# Patient Record
Sex: MALE | Race: WHITE | HISPANIC OR LATINO | Employment: FULL TIME | URBAN - METROPOLITAN AREA
[De-identification: names, ages, dates, MRNs, and addresses within clinical notes are randomized per-mention and may not be internally consistent; named-entity substitution may affect disease eponyms.]

---

## 2018-07-21 ENCOUNTER — APPOINTMENT (OUTPATIENT)
Dept: RADIOLOGY | Facility: MEDICAL CENTER | Age: 48
End: 2018-07-21
Attending: EMERGENCY MEDICINE
Payer: COMMERCIAL

## 2018-07-21 ENCOUNTER — HOSPITAL ENCOUNTER (EMERGENCY)
Facility: MEDICAL CENTER | Age: 48
End: 2018-07-21
Attending: EMERGENCY MEDICINE
Payer: COMMERCIAL

## 2018-07-21 VITALS
DIASTOLIC BLOOD PRESSURE: 85 MMHG | TEMPERATURE: 97.6 F | SYSTOLIC BLOOD PRESSURE: 121 MMHG | BODY MASS INDEX: 24.8 KG/M2 | OXYGEN SATURATION: 100 % | RESPIRATION RATE: 18 BRPM | HEART RATE: 72 BPM | HEIGHT: 67 IN | WEIGHT: 158 LBS

## 2018-07-21 DIAGNOSIS — S43.015A ANTERIOR DISLOCATION OF LEFT SHOULDER, INITIAL ENCOUNTER: ICD-10-CM

## 2018-07-21 PROCEDURE — 73080 X-RAY EXAM OF ELBOW: CPT | Mod: LT

## 2018-07-21 PROCEDURE — 73020 X-RAY EXAM OF SHOULDER: CPT | Mod: LT

## 2018-07-21 PROCEDURE — 71045 X-RAY EXAM CHEST 1 VIEW: CPT

## 2018-07-21 PROCEDURE — 73030 X-RAY EXAM OF SHOULDER: CPT | Mod: LT

## 2018-07-21 PROCEDURE — 72100 X-RAY EXAM L-S SPINE 2/3 VWS: CPT

## 2018-07-21 PROCEDURE — 96374 THER/PROPH/DIAG INJ IV PUSH: CPT

## 2018-07-21 PROCEDURE — 99285 EMERGENCY DEPT VISIT HI MDM: CPT

## 2018-07-21 PROCEDURE — 700111 HCHG RX REV CODE 636 W/ 250 OVERRIDE (IP): Performed by: EMERGENCY MEDICINE

## 2018-07-21 PROCEDURE — 23650 CLTX SHO DSLC W/MNPJ WO ANES: CPT

## 2018-07-21 RX ORDER — IBUPROFEN 800 MG/1
800 TABLET ORAL EVERY 8 HOURS PRN
Qty: 30 TAB | Refills: 0 | Status: SHIPPED | OUTPATIENT
Start: 2018-07-21 | End: 2018-07-24

## 2018-07-21 RX ADMIN — PROPOFOL 70 MG: 10 INJECTION, EMULSION INTRAVENOUS at 17:14

## 2018-07-21 ASSESSMENT — LIFESTYLE VARIABLES: DO YOU DRINK ALCOHOL: NO

## 2018-07-21 NOTE — ED TRIAGE NOTES
"Chief Complaint   Patient presents with   • Fall     about 3 feet down off a ladder and caught himself with his left arm.   • Shoulder Injury     Pt felt a pop in his left shoulder. + deformity.      Pt was given 200 mcg of fentanyl and 5 mg versed pta by EMS. Pt is Cypriot speaking only, coworker at bedside. -LOC.   /85   Pulse 74   Temp 36.4 °C (97.6 °F)   Resp 16   Ht 1.702 m (5' 7\")   Wt 71.7 kg (158 lb)   SpO2 96%   BMI 24.75 kg/m²   In gown, on monitor, chart up for ERP.   "

## 2018-07-21 NOTE — LETTER
"  FORM C-4:  EMPLOYEE’S CLAIM FOR COMPENSATION/ REPORT OF INITIAL TREATMENT  EMPLOYEE’S CLAIM - PROVIDE ALL INFORMATION REQUESTED   First Name  Jose Last Name  Grayson Birthdate             Age  1970 47 y.o. 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 (if applicable)  Climbing ladder    How did this injury or occupational disease occur? Be specific and answer in detail. Use additional sheet if necessary)  Climbing ladder, foot slipped off ladder rung, tried to grab onto ladder with hand to catch myself from the fall; fell 3-4 ft   If you believe that you have an occupational disease, when did you first have knowledge of the disability and it relationship to your employment?  n/a Witnesses to the Accident  Avis Negrete Maria G     Nature of Injury or Occupational Disease  Workers' Compensation  Part(s) of Body Injured or Affected  Upper Arm (L), Lower Arm (L), Soft Tissue    I certify that the above is true and correct to the best of my " knowledge and that I have provided this information in order to obtain the benefits of [place] or [place] of [place]).  I hereby authorize any physician, chiropractor, surgeon, practitioner, or other person, any hospital, including [facility] or [facility], any medical service organization, any insurance company, or other institution or organization to release to each other, any medical or other information, including benefits paid or payable, pertinent to this injury or disease, except information relative to diagnosis, treatment and/or counseling for AIDS, psychological conditions, alcohol or controlled substances, for which I must give specific authorization.  A Photostat of this authorization shall be as valid as the original.   Date 07/21/2018 Place  [facility] Employee’s Signature   THIS REPORT MUST BE COMPLETED AND MAILED WITHIN 3 WORKING DAYS OF TREATMENT   Place  [facility], EMERGENCY DEPT  Name of Facility   [facility]   Date  7/21/2018 Diagnosis  (S43.015A) Anterior dislocation of left shoulder, initial encounter Is there evidence the injured employee was under the influence of alcohol and/or another controlled substance at the time of accident?   Hour  5:45 PM Description of Injury or Disease  Anterior dislocation of left shoulder, initial encounter No   Treatment  Physician evaluation and treatment of left sided shoulder dislocation with closed reduction under conscious sedation  Have you advised the patient to remain off work five days or more?         No   X-Ray Findings  Positive  Comments:dislocation of left shoulder   If Yes   From Date    To Date      From information given by the employee, together with medical evidence, can you directly connect this injury or occupational disease as job incurred?  Yes If No, is the employee capable of: Full Duty  No Modified  "Duty  Yes   Is additional medical care by a physician indicated?  Yes  Comments:follow-up with orthopedics to assess for shoulder injury If Modified Duty, Specify any Limitations / Restrictions  No use of left upper extremity until seen by orthopedics     Do you know of any previous injury or disease contributing to this condition or occupational disease?  No   Date  7/21/2018 Print Doctor’s Name  Ben Castro I certify the employer’s copy of this form was mailed on:   Address  [address] [place] [id number] [phone] Insurer’s Use Only   [facility]  [id number]    Provider’s Tax ID Number  [id number] Telephone  Dept: [phone]    Doctor’s Signature  e-BEN Crowe M.D. Degree   MD    Original - TREATING PHYSICIAN OR CHIROPRACTOR   Pg 2-Insurer/TPA   Pg 3-Employer   Pg 4-Employee                                                                                                  Form C-4 (rev01/03)     BRIEF DESCRIPTION OF RIGHTS AND BENEFITS  (Pursuant to [place]RS 616C.050)    Notice of Injury or Occupational Disease (Incident Report Form C-1): If an injury or occupational disease (OD) arises out of and in the course of employment, you must provide written notice to your employer as soon as practicable, but no later than 7 days after the accident or OD. Your employer shall maintain a sufficient supply of the required forms.  Claim for Compensation (Form C-4): If medical treatment is sought, the form C-4 is available at the place of initial treatment. A completed \"Claim for Compensation\" (Form C-4) must be filed within 90 days after an accident or OD. The treating physician or chiropractor must, within 3 working days after treatment, complete and mail to the employer, the employer's insurer and third-party , the Claim for Compensation.  Medical Treatment: If you require medical treatment for your on-the-job injury or OD, you may be required to select a physician or " chiropractor from a list provided by your workers’ compensation insurer, if it has contracted with an Organization for Managed Care (MCO) or Preferred Provider Organization (PPO) or providers of health care. If your employer has not entered into a contract with an MCO or PPO, you may select a physician or chiropractor from the Panel of Physicians and Chiropractors. Any medical costs related to your industrial injury or OD will be paid by your insurer.  Temporary Total Disability (TTD): If your doctor has certified that you are unable to work for a period of at least 5 consecutive days, or 5 cumulative days in a 20-day period, or places restrictions on you that your employer does not accommodate, you may be entitled to TTD compensation.  Temporary Partial Disability (TPD): If the wage you receive upon reemployment is less than the compensation for TTD to which you are entitled, the insurer may be required to pay you TPD compensation to make up the difference. TPD can only be paid for a maximum of 24 months.  Permanent Partial Disability (PPD): When your medical condition is stable and there is an indication of a PPD as a result of your injury or OD, within 30 days, your insurer must arrange for an evaluation by a rating physician or chiropractor to determine the degree of your PPD. The amount of your PPD award depends on the date of injury, the results of the PPD evaluation and your age and wage.  Permanent Total Disability (PTD): If you are medically certified by a treating physician or chiropractor as permanently and totally disabled and have been granted a PTD status by your insurer, you are entitled to receive monthly benefits not to exceed 66 2/3% of your average monthly wage. The amount of your PTD payments is subject to reduction if you previously received a PPD award.  Vocational Rehabilitation Services: You may be eligible for vocational rehabilitation services if you are unable to return to the job due to a  permanent physical impairment or permanent restrictions as a result of your injury or occupational disease.  Transportation and Per [place] Reimbursement: You may be eligible for travel expenses and per [place] associated with medical treatment.  Reopening: You may be able to reopen your claim if your condition worsens after claim closure.  Appeal Process: If you disagree with a written determination issued by the insurer or the insurer does not respond to your request, you may appeal to the Department of Administration, , by following the instructions contained in your determination letter. You must appeal the determination within 70 days from the date of the determination letter at [address], [address], [place], [place] [address], or [address], [address], [place] [address]. If you disagree with the  decision, you may appeal to the Department of Administration, . You must file your appeal within 30 days from the date of the  decision letter at [address], [address], [place], [place] [address], or [address] [facility] [address], [place] [address]. If you disagree with a decision of an , you may file a petition for judicial review with the District Court. You must do so within 30 days of the Appeal Officer’s decision. You may be represented by an  at your own expense or you may contact the [facility] for possible representation.  [place]  for Injured Workers (NAIW): If you disagree with a  decision, you may request that NAIW represent you without charge at an  Hearing. For information regarding denial of benefits, you may contact the [facility] at: [address], [address], [place] [address], [phone], or [address], [place] [address], [phone]  To File a Complaint with the Division: If you wish to file a complaint with the   of the Division of Industrial Relations (DIR), please contact the Workers’ Compensation Section, [address], [address], [place], [place] [address], telephone [phone]) [phone], or [address], [address], [place], [place] [address], telephone [phone].  For assistance with Workers’ Compensation Issues: you may contact the Office of the Governor Consumer Health Assistance, [address] [place] [address], [address], [place], [place] [address], Toll Free 1-798.210.7900, Web site: [email or web address]., E-mail [email or web address]                                                                                                                                                                               __________________________________________________________________                                    07/21/2018            Employee Name / Signature                                                                                                                            Date                                       D-2 (rev. 10/07)

## 2018-07-21 NOTE — ED PROVIDER NOTES
ED Provider Note    CHIEF COMPLAINT  Chief Complaint   Patient presents with   • Fall     about 3 feet down off a ladder and caught himself with his left arm.   • Shoulder Injury     Pt felt a pop in his left shoulder. + deformity.        HPI  Jose Galicia is a 47 y.o. male who presents for evaluation of acute pain to the left shoulder with injury. The patient was at work was also some scaffolding was about ready to fall grabbed onto something above his head with his left hand and felt a popping sensation in his left shoulder. He did not fall and strike his head chest abdomen or pelvis. Paramedics arrived and noted deformity to his acromioclavicular joint. IV was established and he was given 200 µg of fentanyl and 5 mg of Versed. He has no stated or significant medical history injury occurred 30 minutes prior to arrival no other complaints    REVIEW OF SYSTEMS  See HPI for further details. No numbness tingling weakness head injury loss of consciousness All other systems are negative.     PAST MEDICAL HISTORY  No past medical history on file.  No significant medical history  FAMILY HISTORY  Noncontributory    SOCIAL HISTORY  Social History     Social History   • Marital status: Single     Spouse name: N/A   • Number of children: N/A   • Years of education: N/A     Social History Main Topics   • Smoking status: Current Some Day Smoker   • Smokeless tobacco: Not on file   • Alcohol use Yes      Comment: SOCIALLY   • Drug use: No   • Sexual activity: Not on file     Other Topics Concern   • Not on file     Social History Narrative   • No narrative on file     Smoke cigarettes nor all of this  SURGICAL HISTORY  No past surgical history on file.    CURRENT MEDICATIONS  Home Medications     Reviewed by Nataly Perla R.N. (Registered Nurse) on 07/21/18 at 1619  Med List Status: Complete   Medication Last Dose Status   oxycodone-acetaminophen (PERCOCET) 5-325 MG TABS not taking Active                ALLERGIES  No Known  "Allergies    PHYSICAL EXAM  VITAL SIGNS: /85   Pulse 69   Temp 36.4 °C (97.6 °F)   Resp 18   Ht 1.702 m (5' 7\")   Wt 71.7 kg (158 lb)   SpO2 99%   BMI 24.75 kg/m²       Constitutional: Patient appears to be uncomfortable   HENT: Normocephalic, Atraumatic, Bilateral external ears normal, Oropharynx moist, No oral exudates, Nose normal.   Eyes: PERRLA, EOMI, Conjunctiva normal, No discharge.   Neck: Normal range of motion, No tenderness, Supple, No stridor.   Cardiovascular: Normal heart rate, Normal rhythm, No murmurs, No rubs, No gallops.   Thorax & Lungs: Normal breath sounds, No respiratory distress, No wheezing, No chest tenderness.   Abdomen: Bowel sounds normal, Soft, No tenderness, No masses, No pulsatile masses.   Skin: Warm, Dry, No erythema, No rash.   Back: No tenderness, No CVA tenderness.   Extremities: Left upper extremity exam is notable for an acromioclavicular step-off consistent with a dislocation. No bony tenderness over the clavicle proximal humerus elbow wrist. Sensation over the deltoid is normal radial pulses intact   Neurologic: Alert & oriented x 3, Normal motor function, Normal sensory function, No focal deficits noted.   Psychiatric: Anxious     DX-SHOULDER 1 VIEW LEFT   Final Result         1. Interval relocation of the left glenohumeral joint. No obvious fracture on this limited single AP view.      DX-ELBOW-COMPLETE 3+ LEFT   Final Result      Normal elbow series.      DX-CHEST-PORTABLE (1 VIEW)   Final Result         1. No acute cardiopulmonary abnormalities are identified.      DX-SHOULDER 2+ LEFT   Final Result      Anterior left shoulder dislocation.               INTERPRETING LOCATION:  [address] [place] [address], [id number]            RADIOLOGY/PROCEDURES  Physician procedure: Closed reduction of left anterior dislocation of shoulder. Consent was obtained from the wife. The patient was placed on supplemental oxygen and cardiac monitoring. He was given a total of 70 mg of IV " propofol. Using traction countertraction with and scapular manipulation the shoulder was dislocated and No complication. The patient was placed in a shoulder immobilizer. Postprocedure radiograph demonstrated appropriate reduction no complications    COURSE & MEDICAL DECISION MAKING  Pertinent Labs & Imaging studies reviewed. (See chart for details)  Patient tolerated the procedure quite well. He will be placed in a shoulder immobilizer. I'll give him a prescription of high-dose ibuprofen and refer him to orthopedics for ongoing management    FINAL IMPRESSION  1. Left-sided shoulder dislocation with ERP closed reduction under conscious sedation      Electronically signed by: Ben Castro, 7/21/2018 4:30 PM

## 2018-07-22 NOTE — ED NOTES
ERP, RN, Tech and RT at bedside for conscious sedation with shoulder relocation. Relocation successful. Pt placed in sling. VSS. Family to bedside and aware of POC.

## 2018-07-22 NOTE — ED NOTES
All lines and monitors discontinued. Discharge instructions given, questions answered.    AMbulated out of ER, escorted by RN.  Instructed not to drive after taking pain medication and pt verbalizes understanding.  Rx x 1 given.

## 2018-07-22 NOTE — PROGRESS NOTES
"Pt's family wanted a doctors note.  I reprinted the d/c instructions and highlighted \"Follow-up with [facility]\" listed on second page.  I also gave him a work note that stated he needs to f/u with [facility] in 3 days.  Family verbalized understanding.   No other needs at this time.  "

## 2018-07-22 NOTE — DISCHARGE INSTRUCTIONS
"Dislocación  (Dislocation, General)  En la dislocación, las superficies de la articulación que normalmente se encuentran unidas, se separan. Los huesos están fuera de lugar.   SÍNTOMAS  Normalmente se asocian con el dolor, inflamación e incapacidad para  la articulación. Suele producir nikhil deformidad en la articulación.   DIAGNÓSTICO  Generalmente el diagnóstico se realiza fácilmente con el examen físico. Generalmente se raphael nikhil radiografía para comprobar que no ha sufrido nikhil fractura (rotura de los huesos).  TRATAMIENTO  La dislocación de la articulación requiere tratamiento. El tratamiento se denomina \"reducción\" lo que significa que los huesos se colocan en curry lugar. Si no se trata, el resultado podría ser nikhil deformidad, con nikhil articulación inestable. Algunas dislocaciones de articulaciones pueden resultar en un daño extensivo a ligamentos, cartílago u otros tejido que puedan requerir cirugía.  INSTRUCCIONES PARA EL CUIDADO DOMICILIARIO  · Aplique hielo sobre el área dolorida smiley 15 a 20 minutos 3 a 4 veces por día. Hágalo mientras se encuentre despierto, smiley los dos primeros días. Ponga el hielo en nikhil bolsa plástica y coloque nikhil toalla entre la bolsa y la piel.  · Mantenga la demetrius de la lesión elevada cuando le sea posible, para disminuir la hinchazón.  · Continúe con las actividades físicas lizz claudio se le indicó  · Si se ha dislocado nikhil extremidad inferior, utilice muletas, bastones o andadores según se le indique.  · Sólo tome medicamentos de venta eh o prescriptos para calmar el dolor, las molestias, o bajar la fiebre según las indicaciones de curry médico.  SOLICITE ATENCIÓN MÉDICA DE INMEDIATO SI:  · Aumenta el hematoma, la hinchazón o el dolor en el área de la dislocación.  · Siente frío o adormecimiento en las partes cercanas a la dislocación.  · El dolor no se ras con medicamentos.  · El dolor es intenso.  · Parece o siente que los huesos se aponte salido nuevamente de " abimael.  ASEGÚRESE QUE:   · Comprende esas instrucciones para el maryam médica.  · Controlará curry enfermedad.  · Pedirá ayuda de inmediato si no lo hace gaby o empeora.  Document Released: 04/05/2010 Document Revised: 03/11/2013  retickr® Patient Information ©2014 [employer].

## 2018-07-24 ENCOUNTER — OCCUPATIONAL MEDICINE (OUTPATIENT)
Dept: URGENT CARE | Facility: PHYSICIAN GROUP | Age: 48
End: 2018-07-24
Payer: COMMERCIAL

## 2018-07-24 ENCOUNTER — HOSPITAL ENCOUNTER (OUTPATIENT)
Dept: RADIOLOGY | Facility: MEDICAL CENTER | Age: 48
End: 2018-07-24
Attending: NURSE PRACTITIONER
Payer: COMMERCIAL

## 2018-07-24 VITALS
OXYGEN SATURATION: 97 % | HEIGHT: 67 IN | BODY MASS INDEX: 24.8 KG/M2 | WEIGHT: 158 LBS | DIASTOLIC BLOOD PRESSURE: 82 MMHG | TEMPERATURE: 97.8 F | HEART RATE: 85 BPM | RESPIRATION RATE: 16 BRPM | SYSTOLIC BLOOD PRESSURE: 118 MMHG

## 2018-07-24 DIAGNOSIS — M54.2 NECK PAIN: ICD-10-CM

## 2018-07-24 DIAGNOSIS — S16.1XXA ACUTE CERVICAL MYOFASCIAL STRAIN, INITIAL ENCOUNTER: ICD-10-CM

## 2018-07-24 DIAGNOSIS — G89.29 CHRONIC LEFT-SIDED LOW BACK PAIN WITH LEFT-SIDED SCIATICA: ICD-10-CM

## 2018-07-24 DIAGNOSIS — S43.005A DISLOCATION OF LEFT SHOULDER JOINT, INITIAL ENCOUNTER: ICD-10-CM

## 2018-07-24 DIAGNOSIS — M54.42 CHRONIC LEFT-SIDED LOW BACK PAIN WITH LEFT-SIDED SCIATICA: ICD-10-CM

## 2018-07-24 PROCEDURE — 72040 X-RAY EXAM NECK SPINE 2-3 VW: CPT

## 2018-07-24 PROCEDURE — 99203 OFFICE O/P NEW LOW 30 MIN: CPT | Performed by: NURSE PRACTITIONER

## 2018-07-24 RX ORDER — MECLIZINE HYDROCHLORIDE 25 MG/1
TABLET ORAL
Refills: 1 | COMMUNITY
Start: 2018-07-10 | End: 2018-12-13

## 2018-07-24 RX ORDER — GABAPENTIN 100 MG/1
CAPSULE ORAL
Refills: 3 | COMMUNITY
Start: 2018-07-10 | End: 2018-12-13

## 2018-07-24 RX ORDER — NAPROXEN 500 MG/1
TABLET ORAL
Refills: 0 | COMMUNITY
Start: 2018-07-10 | End: 2018-12-13

## 2018-07-24 ASSESSMENT — ENCOUNTER SYMPTOMS
BACK PAIN: 1
NEUROLOGICAL NEGATIVE: 1
FALLS: 1
CONSTITUTIONAL NEGATIVE: 1
PSYCHIATRIC NEGATIVE: 1
RESPIRATORY NEGATIVE: 1
NECK PAIN: 1
GASTROINTESTINAL NEGATIVE: 1
CARDIOVASCULAR NEGATIVE: 1

## 2018-07-24 NOTE — LETTER
[facility]  [address] [facility] [facility] [place] [address]  Phone:  [phone] - Fax:  [phone]   [facility] Progress Report and Disability Certification  Date of Service: 7/24/2018   No Show:  No  Date / Time of Next Visit: 7/26/2018   Claim Information   Patient Name: Jose Galicia  Claim Number:     Employer:   [employer] Date of Injury: 7/21/2018     Insurer / TPA: Lynda Chenoa  ID / SSN:     Occupation: Sanitation  Diagnosis: Diagnoses of Acute cervical myofascial strain, initial encounter, Dislocation of left shoulder joint, initial encounter, and Chronic left-sided low back pain with left-sided sciatica were pertinent to this visit.    Medical Information   Related to Industrial Injury? Yes    Subjective Complaints:  DOI 7/21/18: Patient was standing on scaffolding, approx 5 feet from ground, when he accidentally fell onto his left side. He immediately developed pain to his left shoulder. He was seen in the ER that day and diagnosed with a shoulder dislocation. His shoulder was reduced and placed in a sling. Pain is moderate to his left shoulder, denies worsening, he was not given work restrictions but was referred to ortho, awaiting appte. He is taking ibuprofen for pain. He also admits to low back pain from this fall, with pain radiating down left leg. X-ray was completed of lumbar spine in ED and negative for fracture. He noticed neck pain the following day. He did not have radiology studies of neck. Admits to history of neck pain and low back pain (with left sided radiation) from a work related accident 7 years ago, he experiences recurrence of this pain frequently but does not see a doctor regularly for. He does not have any other jobs or contributing factors.    Objective Findings: A/Ox4. NAD. There is tenderness to cervical and lumbar spine as well as paraspinal regions. N/V intact. Mild tenderness to left AC, immobilizer in place. Left arm is  otherwise normal in appearance. Skin intact, no bruising or ecchymosis. Patellar DTRs +2. Gait steady.     Pre-Existing Condition(s): Previous neck and low back pain   Assessment:   Initial Visit    Status: Additional Care Required  Permanent Disability:No    Plan: Medication  Comments:OTC NSAIDS, RICE, arm immobilizer with ROM exercises, F/U with [facility] in 48 hours, work restrictions.     Diagnostics: X-ray    Comments:  X-ray cervical spine negative for fracture.     Disability Information   Status: Released to Restricted Duty    From:  2018  Through: 2018 Restrictions are: Temporary   Physical Restrictions   Sitting:    Standing:    Stooping:  < or = to 1 hr/day Bending:  < or = to 1 hr/day   Squatting:    Walking:    Climbing:    Pushin hrs/day  Comments:left arm   Pullin hrs/day  Comments:left arm Other:    Reaching Above Shoulder (L): 0 hrs/day Reaching Above Shoulder (R):       Reaching Below Shoulder (L):  0 hrs/day Reaching Below Shoulder (R):      Not to exceed Weight Limits   Carrying(hrs): 0  Comments:left arm Weight Limit(lb):   Lifting(hrs): 0  Comments:left arm Weight  Limit(lb):     Comments:      Repetitive Actions   Hands: i.e. Fine Manipulations from Graspin hrs/day  Comments:left arm   Feet: i.e. Operating Foot Controls:     Driving / Operate Machinery:     Physician Name: KAMRAN Chandra Physician Signature: CHIOMA Cook e-Signature: Dr. Nasir Correa, Medical Director   Clinic Name / Location: [address] [address] Clinic Phone Number: Dept: [phone]   Appointment Time: 4:15 Pm Visit Start Time: 4:39 PM   Check-In Time:  3:52 Pm Visit Discharge Time:  7:23PM   Original-Treating Physician or Chiropractor    Page 2-Insurer/TPA    Page 3-Employer    Page 4-Employee

## 2018-07-25 NOTE — PROGRESS NOTES
"Subjective:      Jose Galicia is a 47 y.o. male who presents with Work-Related Injury (FV L shoulder DOI: 7/21/2018)      HPI  DOI 7/21/18: Patient was standing on scaffolding, approx 5 feet from ground, when he accidentally fell onto his left side. He immediately developed pain to his left shoulder. He was seen in the ER that day and diagnosed with a shoulder dislocation. His shoulder was reduced and placed in a sling. Pain is moderate to his left shoulder, denies worsening, he was not given work restrictions but was referred to ortho, awaiting appte. He is taking ibuprofen for pain. He also admits to low back pain from this fall, with pain radiating down left leg. X-ray was completed of lumbar spine in ED and negative for fracture. He noticed neck pain the following day. He did not have radiology studies of neck. Admits to history of neck pain and low back pain (with left sided radiation) from a work related accident 7 years ago, he experiences recurrence of this pain frequently but does not see a doctor regularly for. He does not have any other jobs or contributing factors.     History reviewed. No pertinent past medical history.  History reviewed. No pertinent surgical history.  No current outpatient prescriptions on file prior to visit.     No current facility-administered medications on file prior to visit.      Patient has no known allergies.    Review of Systems   Constitutional: Negative.    HENT: Negative.    Respiratory: Negative.    Cardiovascular: Negative.    Gastrointestinal: Negative.    Genitourinary: Negative.    Musculoskeletal: Positive for back pain, falls, joint pain and neck pain.   Skin: Negative.    Neurological: Negative.    Psychiatric/Behavioral: Negative.           Objective:     /82   Pulse 85   Temp 36.6 °C (97.8 °F)   Resp 16   Ht 1.702 m (5' 7\")   Wt 71.7 kg (158 lb)   SpO2 97%   BMI 24.75 kg/m²      Physical Exam   Constitutional: He is oriented to person, place, and " time. Vital signs are normal. He appears well-developed and well-nourished. He is active. He does not have a sickly appearance. He does not appear ill. No distress.   HENT:   Head: Normocephalic and atraumatic.   Right Ear: External ear normal.   Left Ear: External ear normal.   Nose: Nose normal.   Mouth/Throat: Oropharynx is clear and moist.   Eyes: Conjunctivae are normal. Pupils are equal, round, and reactive to light. Right eye exhibits no discharge. Left eye exhibits no discharge. No scleral icterus.   Neck: Normal range of motion and full passive range of motion without pain. Neck supple. No JVD present. Spinous process tenderness and muscular tenderness present. No neck rigidity. No tracheal deviation, no edema, no erythema and normal range of motion present.   Cardiovascular: Normal rate, regular rhythm, normal heart sounds and intact distal pulses.    Pulmonary/Chest: Effort normal and breath sounds normal. No stridor. No respiratory distress. He has no wheezes.   Musculoskeletal: He exhibits tenderness. He exhibits no edema or deformity.        Left shoulder: He exhibits decreased range of motion, tenderness, bony tenderness and pain. He exhibits no swelling, no effusion, no crepitus, no deformity, no laceration, no spasm, normal pulse and normal strength.        Cervical back: He exhibits tenderness, bony tenderness, pain and spasm.        Lumbar back: He exhibits tenderness, bony tenderness, pain and spasm.   There is tenderness to cervical and lumbar spine as well as paraspinal regions. N/V intact. Mild tenderness to left AC, immobilizer in place. Left arm is otherwise normal in appearance. Skin intact, no bruising or ecchymosis. Patellar DTRs +2. Gait steady.     Lymphadenopathy:     He has no cervical adenopathy.   Neurological: He is alert and oriented to person, place, and time. He has normal strength. He displays normal reflexes. No cranial nerve deficit or sensory deficit. He exhibits normal  "muscle tone. Coordination and gait normal. GCS eye subscore is 4. GCS verbal subscore is 5. GCS motor subscore is 6.   Skin: Skin is warm, dry and intact. Capillary refill takes less than 2 seconds. No abrasion, no bruising, no ecchymosis, no laceration and no rash noted. He is not diaphoretic. No erythema. No pallor.   Psychiatric: He has a normal mood and affect. His behavior is normal. Judgment and thought content normal.   Vitals reviewed.           Assessment/Plan:     1. Acute cervical myofascial strain, initial encounter  DX-CERVICAL SPINE-2 OR 3 VIEWS   2. Dislocation of left shoulder joint, initial encounter     3. Chronic left-sided low back pain with left-sided sciatica           - DX-CERVICAL SPINE-2 OR 3 VIEWS radiology reading \"Mild degenerative changes.\"        OTC NSAIDS, RICE, arm immobilizer with ROM exercises, F/U with [facility] health in 48 hours, work restrictions.  Supportive care, differential diagnoses, and indications for immediate follow-up discussed with patient.   Pathogenesis of diagnosis discussed including typical length and natural progression.   Instructed to return to clinic or nearest emergency department sooner for any change in condition, further concerns, or worsening of symptoms.  Patient states understanding of the plan of care and discharge instructions.          HERMELINDA Chandra.                "

## 2018-07-26 ENCOUNTER — OCCUPATIONAL MEDICINE (OUTPATIENT)
Dept: OCCUPATIONAL MEDICINE | Facility: CLINIC | Age: 48
End: 2018-07-26
Payer: COMMERCIAL

## 2018-07-26 VITALS
OXYGEN SATURATION: 98 % | SYSTOLIC BLOOD PRESSURE: 110 MMHG | BODY MASS INDEX: 25.11 KG/M2 | HEIGHT: 67 IN | TEMPERATURE: 97.9 F | WEIGHT: 160 LBS | DIASTOLIC BLOOD PRESSURE: 82 MMHG | RESPIRATION RATE: 16 BRPM | HEART RATE: 83 BPM

## 2018-07-26 DIAGNOSIS — S43.005D CLOSED DISLOCATION OF LEFT SHOULDER, SUBSEQUENT ENCOUNTER: ICD-10-CM

## 2018-07-26 PROCEDURE — 99204 OFFICE O/P NEW MOD 45 MIN: CPT | Performed by: PREVENTIVE MEDICINE

## 2018-07-26 RX ORDER — IBUPROFEN 800 MG/1
800 TABLET ORAL EVERY 8 HOURS PRN
COMMUNITY
End: 2018-12-13

## 2018-07-26 NOTE — PROGRESS NOTES
"Subjective:      Jose Galicia is a 47 y.o. male who presents with Follow-Up (WC DOI 7/21/18 left arm/shoulder, feeling the same, room pr1)      Mechanism of injury-about 3 feet down off a ladder and caught himself with his left arm.  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  This was reduced in the emergency department.  Orthopedic surgery referral was made but has not occurred.  He continues to complain of moderate pain in the left shoulder and some tingling in the fingertips.  Also, he notes he has had an aggravation of his previous chronic industrial low back strain with sciatica.     HPI    Review of Systems   Comprehensive medical history form reviewed. Pertinent positives and negatives included in HPI.    PFSH: reviewed in Epic    PMH:  has no past medical history on file.  MEDS:   Current Outpatient Prescriptions:   •  ibuprofen (MOTRIN) 800 MG Tab, Take 800 mg by mouth every 8 hours as needed., Disp: , Rfl:   •  gabapentin (NEURONTIN) 100 MG Cap, TAKE 1 CAPSULE BY MOUTH 3 TIMES DAILY TO IMPROVE NERVE PAIN, Disp: , Rfl: 3  •  meclizine (ANTIVERT) 25 MG Tab, TAKE 1/2 TO 1 TABLET BY MOUTH EVERY 6 TO 8 HOURS AS NEEDED FOR VERTIGO, Disp: , Rfl: 1  •  naproxen (NAPROSYN) 500 MG Tab, TAKE 1 TABLET BY MOUTH TWICE DAILY : AS NEEDED FOR PAIN. TAKE WITH FOOD, Disp: , Rfl: 0  ALLERGIES: No Known Allergies  SURGHX: History reviewed. No pertinent surgical history.  SOCHX:  reports that he has never smoked. He has never used smokeless tobacco. He reports that he does not drink alcohol or use drugs.  Work Status: Employer and Job Title reviewed per [place] C4 form  FH: No pertinent hereditary disorders.          Objective:     /82   Pulse 83   Temp 36.6 °C (97.9 °F)   Resp 16   Ht 1.702 m (5' 7\")   Wt 72.6 kg (160 lb)   SpO2 98%   BMI 25.06 kg/m²      Physical Exam    Appearance: Well-developed, well-nourished.   Mental Status: Mood and Affect normal. Pleasant. Cooperative. " Appropriate.   ENT: Oropharynx clear. Moist mucous membranes. Hearing normal.   Eyes: Pupils reactive. Conjunctiva normal. No scleral icterus.   Neck: Trachea Midline. No thyromegaly. No masses.  Cardiovascular: Normal rate. Regular rhythm. Normal heart sounds.   Chest: Effort normal. Breath sounds clear.   Skin: Skin is warm and dry. No rash.   Musculoskeletal: Left shoulder is in a shoulder immobilizer.  No deformity noted.  No ecchymosis.  Distal pulses and capillary refill within normal limits.  Back has tenderness and decreased range of motion.         Assessment/Plan:     1. Closed dislocation of left shoulder, subsequent encounter  New to occupational health from emergency department and urgent care  - REFERRAL TO ORTHOPEDICS--done on an urgent basis or walk-in basis-due to tingling in upper extremities  Restricted activity  Continue current medication  Recommend transfer to specialist

## 2018-07-26 NOTE — LETTER
[address],   [address] [place] [address]  Phone:  [phone] - Fax:  [phone]   [facility] Progress Report and Disability Certification  Date of Service: 7/26/2018   No Show:  No  Date / Time of Next Visit: 8/16/2018   Claim Information   Patient Name: Jose Galicia  Claim Number:     Employer:  [employer] Date of Injury: 7/21/2018     Insurer / TPA: Lynda Northford  ID / SSN:     Occupation: Sanitation  Diagnosis: The encounter diagnosis was Closed dislocation of left shoulder, subsequent encounter.    Medical Information   Related to Industrial Injury? Yes    Subjective Complaints:  Mechanism of injury-about 3 feet down off a ladder and caught himself with his left arm.  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  This was reduced in the emergency department.  Orthopedic surgery referral was made but has not occurred.  He continues to complain of moderate pain in the left shoulder and some tingling in the fingertips.  Also, he notes he has had an aggravation of his previous chronic industrial low back strain with sciatica.   Objective Findings: Appearance: Well-developed, well-nourished.   Mental Status: Mood and Affect normal. Pleasant. Cooperative. Appropriate.   ENT: Oropharynx clear. Moist mucous membranes. Hearing normal.   Eyes: Pupils reactive. Conjunctiva normal. No scleral icterus.   Neck: Trachea Midline. No thyromegaly. No masses.  Cardiovascular: Normal rate. Regular rhythm. Normal heart sounds.   Chest: Effort normal. Breath sounds clear.   Skin: Skin is warm and dry. No rash.   Musculoskeletal: Left shoulder is in a shoulder immobilizer.  No deformity noted.  No ecchymosis.  Distal pulses and capillary refill within normal limits.  Back has tenderness and decreased range of motion.     Pre-Existing Condition(s):     Assessment:   Condition Same    Status: Additional Care Required   Permanent Disability:No    Plan: ConsultationTransfer CareMedication    Diagnostics:      Comments:  Urgent orthopedic consultation    Disability Information   Status: Released to Restricted Duty    From:  7/26/2018  Through: 8/16/2018 Restrictions are:     Physical Restrictions   Sitting:    Standing:    Stooping:    Bending:      Squatting:    Walking:    Climbing:    Pushing:  < or = to 1 hr/day   Pulling:  < or = to 1 hr/day Other:    Reaching Above Shoulder (L): 0 hrs/day Reaching Above Shoulder (R):       Reaching Below Shoulder (L):  0 hrs/day Reaching Below Shoulder (R):      Not to exceed Weight Limits   Carrying(hrs):   Weight Limit(lb):   Lifting(hrs):   Weight  Limit(lb): < or = to 10 pounds   Comments: No use of left arm.    Repetitive Actions   Hands: i.e. Fine Manipulations from Grasping:     Feet: i.e. Operating Foot Controls:     Driving / Operate Machinery:     Physician Name: Stepan Mcclellan M.D. Physician Signature: STEPAN Hayes M.D. e-Signature: Dr. Nasir Correa, Medical Director   Clinic Name / Location: [address],   [address]  [place] [address] Clinic Phone Number: Dept: [phone]   Appointment Time: 3:00 Pm Visit Start Time: 2:32 PM   Check-In Time:  2:29 Pm Visit Discharge Time:  3:35pm   Original-Treating Physician or Chiropractor    Page 2-Insurer/TPA    Page 3-Employer    Page 4-Employee

## 2018-12-13 ENCOUNTER — APPOINTMENT (OUTPATIENT)
Dept: ADMISSIONS | Facility: MEDICAL CENTER | Age: 48
End: 2018-12-13
Attending: ORTHOPAEDIC SURGERY
Payer: COMMERCIAL

## 2018-12-13 RX ORDER — ACETAMINOPHEN 160 MG
TABLET,DISINTEGRATING ORAL DAILY
COMMUNITY

## 2018-12-15 ENCOUNTER — HOSPITAL ENCOUNTER (OUTPATIENT)
Facility: MEDICAL CENTER | Age: 48
End: 2018-12-15
Attending: ORTHOPAEDIC SURGERY | Admitting: ORTHOPAEDIC SURGERY
Payer: COMMERCIAL

## 2018-12-15 VITALS
HEART RATE: 69 BPM | DIASTOLIC BLOOD PRESSURE: 71 MMHG | WEIGHT: 163.14 LBS | HEIGHT: 67 IN | SYSTOLIC BLOOD PRESSURE: 119 MMHG | BODY MASS INDEX: 25.61 KG/M2 | RESPIRATION RATE: 16 BRPM | OXYGEN SATURATION: 97 % | TEMPERATURE: 97.2 F

## 2018-12-15 PROCEDURE — 160029 HCHG SURGERY MINUTES - 1ST 30 MINS LEVEL 4: Performed by: ORTHOPAEDIC SURGERY

## 2018-12-15 PROCEDURE — 160046 HCHG PACU - 1ST 60 MINS PHASE II: Performed by: ORTHOPAEDIC SURGERY

## 2018-12-15 PROCEDURE — C1713 ANCHOR/SCREW BN/BN,TIS/BN: HCPCS | Performed by: ORTHOPAEDIC SURGERY

## 2018-12-15 PROCEDURE — 160022 HCHG BLOCK: Performed by: ORTHOPAEDIC SURGERY

## 2018-12-15 PROCEDURE — 700111 HCHG RX REV CODE 636 W/ 250 OVERRIDE (IP)

## 2018-12-15 PROCEDURE — A4450 NON-WATERPROOF TAPE: HCPCS | Performed by: ORTHOPAEDIC SURGERY

## 2018-12-15 PROCEDURE — 160036 HCHG PACU - EA ADDL 30 MINS PHASE I: Performed by: ORTHOPAEDIC SURGERY

## 2018-12-15 PROCEDURE — 160009 HCHG ANES TIME/MIN: Performed by: ORTHOPAEDIC SURGERY

## 2018-12-15 PROCEDURE — 160048 HCHG OR STATISTICAL LEVEL 1-5: Performed by: ORTHOPAEDIC SURGERY

## 2018-12-15 PROCEDURE — A6222 GAUZE <=16 IN NO W/SAL W/O B: HCPCS | Performed by: ORTHOPAEDIC SURGERY

## 2018-12-15 PROCEDURE — 160035 HCHG PACU - 1ST 60 MINS PHASE I: Performed by: ORTHOPAEDIC SURGERY

## 2018-12-15 PROCEDURE — 502000 HCHG MISC OR IMPLANTS RC 0278: Performed by: ORTHOPAEDIC SURGERY

## 2018-12-15 PROCEDURE — 160002 HCHG RECOVERY MINUTES (STAT): Performed by: ORTHOPAEDIC SURGERY

## 2018-12-15 PROCEDURE — 700101 HCHG RX REV CODE 250

## 2018-12-15 PROCEDURE — 500423 HCHG DRESSING, ABD COMBINE: Performed by: ORTHOPAEDIC SURGERY

## 2018-12-15 PROCEDURE — 502581 HCHG PACK, SHOULDER ARTHROSCOPY: Performed by: ORTHOPAEDIC SURGERY

## 2018-12-15 PROCEDURE — 160041 HCHG SURGERY MINUTES - EA ADDL 1 MIN LEVEL 4: Performed by: ORTHOPAEDIC SURGERY

## 2018-12-15 PROCEDURE — 501838 HCHG SUTURE GENERAL: Performed by: ORTHOPAEDIC SURGERY

## 2018-12-15 PROCEDURE — 160025 RECOVERY II MINUTES (STATS): Performed by: ORTHOPAEDIC SURGERY

## 2018-12-15 DEVICE — ANCHOR SUTURE ICONIX 1 WITH XBRAID 1.2MM 1.4MM (5EA/BX): Type: IMPLANTABLE DEVICE | Site: SHOULDER | Status: FUNCTIONAL

## 2018-12-15 DEVICE — ANCHOR SUTURE ICONIX 3 WITH 3 STRANDS #2 FORCE FIBER 2.3MM (5EA/BX): Type: IMPLANTABLE DEVICE | Site: SHOULDER | Status: FUNCTIONAL

## 2018-12-15 RX ORDER — OXYCODONE HCL 5 MG/5 ML
5 SOLUTION, ORAL ORAL
Status: DISCONTINUED | OUTPATIENT
Start: 2018-12-15 | End: 2018-12-15 | Stop reason: HOSPADM

## 2018-12-15 RX ORDER — MEPERIDINE HYDROCHLORIDE 25 MG/ML
12.5 INJECTION INTRAMUSCULAR; INTRAVENOUS; SUBCUTANEOUS
Status: DISCONTINUED | OUTPATIENT
Start: 2018-12-15 | End: 2018-12-15 | Stop reason: HOSPADM

## 2018-12-15 RX ORDER — SODIUM CHLORIDE, SODIUM LACTATE, POTASSIUM CHLORIDE, CALCIUM CHLORIDE 600; 310; 30; 20 MG/100ML; MG/100ML; MG/100ML; MG/100ML
1000 INJECTION, SOLUTION INTRAVENOUS CONTINUOUS
Status: DISCONTINUED | OUTPATIENT
Start: 2018-12-15 | End: 2018-12-15 | Stop reason: HOSPADM

## 2018-12-15 RX ORDER — HALOPERIDOL 5 MG/ML
1 INJECTION INTRAMUSCULAR
Status: DISCONTINUED | OUTPATIENT
Start: 2018-12-15 | End: 2018-12-15 | Stop reason: HOSPADM

## 2018-12-15 RX ORDER — BACITRACIN 65 UNIT/MG
POWDER (GRAM) MISCELLANEOUS
Status: DISCONTINUED | OUTPATIENT
Start: 2018-12-15 | End: 2018-12-15 | Stop reason: HOSPADM

## 2018-12-15 RX ORDER — ONDANSETRON 2 MG/ML
4 INJECTION INTRAMUSCULAR; INTRAVENOUS
Status: DISCONTINUED | OUTPATIENT
Start: 2018-12-15 | End: 2018-12-15 | Stop reason: HOSPADM

## 2018-12-15 RX ORDER — EPINEPHRINE 1 MG/ML(1)
AMPUL (ML) INJECTION
Status: DISCONTINUED | OUTPATIENT
Start: 2018-12-15 | End: 2018-12-15 | Stop reason: HOSPADM

## 2018-12-15 RX ORDER — OXYCODONE HCL 5 MG/5 ML
10 SOLUTION, ORAL ORAL
Status: DISCONTINUED | OUTPATIENT
Start: 2018-12-15 | End: 2018-12-15 | Stop reason: HOSPADM

## 2018-12-15 RX ORDER — SODIUM CHLORIDE, SODIUM LACTATE, POTASSIUM CHLORIDE, CALCIUM CHLORIDE 600; 310; 30; 20 MG/100ML; MG/100ML; MG/100ML; MG/100ML
INJECTION, SOLUTION INTRAVENOUS ONCE
Status: COMPLETED | OUTPATIENT
Start: 2018-12-15 | End: 2018-12-15

## 2018-12-15 RX ADMIN — SODIUM CHLORIDE, SODIUM LACTATE, POTASSIUM CHLORIDE, CALCIUM CHLORIDE: 600; 310; 30; 20 INJECTION, SOLUTION INTRAVENOUS at 09:45

## 2018-12-15 ASSESSMENT — PAIN SCALES - GENERAL
PAINLEVEL_OUTOF10: ASSUMED PAIN PRESENT
PAINLEVEL_OUTOF10: 0

## 2018-12-15 NOTE — OR NURSING
"1216 [place], side rails up x 2 for safety, lungs clear bilaterally, scds on patient and machine operational, dressing CDI to L shoulder, +1 L radial pulse with pink/warm fingers, <3 sec cap refill and immobilizer in place to LUE. Pt does not arouse to voice or touch. Breathing easy and unlabored. HOB elevated at 20 degrees.   1220 Polar ice pad with protector in place over L shoulder and machine operational. Warm blanket placed over patient for comfort.   1230 Pt arouses to voice and RN asks patient in Ghanaian regarding pain; pt points to L shoulder and reports \"small\" amount of pain. Denies nausea. Calm and cooperative. Minimal movement to L fingers post nerve block.   1245 Call placed to  services and utilized  #[id number]. Pt denies pain and nausea at this time. Pt reassured that he is doing well and educated about nerve block sensation. Reviewed plan of care. Pt denies any problems or questions. Answers  appropriately. Given sip of water at this time.   1300 Pt remains awake without stimulation. Denies pain or nausea. Tolerating sips of water.   1315 No changes  1330 Family at bedside; daughter speaks English; pt reports \"ready to go home\". Denies nausea and pain. Reassured that numbness to LUE expected post nerve block. Meets criteria for transfer to stage II; will discharge from PACU.   1339 Pt dressed with assist by RN. Denies pain and nausea after sitting/standing up. Sitting on side of gurney. Immobilizer positioned for proper alignment; polar pad placed over clothes and verbal instructions/demonstration given regarding use of polar care; daughter states verbal understanding.   1405 Completed review of discharge instructions with English speaking daughter; states verbal understanding after review of questions. Pt reports ready to go home.   1414 D/Shravan to care of family post uneventful stay in PACU 2.    "

## 2018-12-15 NOTE — DISCHARGE INSTRUCTIONS
ACTIVITY: Rest and take it easy for the first 24 hours.  A responsible adult is recommended to remain with you during that time.  It is normal to feel sleepy.  We encourage you to not do anything that requires balance, judgment or coordination.    MILD FLU-LIKE SYMPTOMS ARE NORMAL. YOU MAY EXPERIENCE GENERALIZED MUSCLE ACHES, THROAT IRRITATION, HEADACHE AND/OR SOME NAUSEA.    FOR 24 HOURS DO NOT:  Drive, operate machinery or run household appliances.  Drink beer or alcoholic beverages.   Make important decisions or sign legal documents.    SPECIAL INSTRUCTIONS: Post-Operative Shoulder Instructions       Dressing and wound care: Keep your shoulder dressing clean and dry after surgery.  Be aware that some leaking of blood or fluid from your dressing can occur and is normal. You may remove your dressing 3 days after the operation.  Notice that you have a single incision and/or several small incisions that have been closed with stitches.  Cover each of these incisions with a light dressing or band-aids.  This keeps the surgical incisions clean, as well as preventing your clothes from spotting with blood or fluid.  Change band-aids or light dressing daily.     Shower / bathing: Keep the shoulder dry for 3 days after your surgery.  Then, you may shower. You may let soap and water run over skin incisions, but do not immerse your shoulder in water.  No swimming pools, hot tubs, or baths are recommended until at least 3 weeks after surgery.     Ice: Apply an ice pack to your shoulder (15 minutes on the shoulder, 15 minutes off the shoulder), as you feel necessary to help with the pain and swelling.         Sling / Shoulder Immobilizer: The sling should be on at all times, except when bathing and doing your demonstrated exercises.     Activity: It is important to move your shoulder, as well as your elbow, wrist, and hand several times daily, starting the day after surgery.  You may do pendulum exercises with your operative  arm starting the day after surgery.  Pendulum (dangling Eagle) exercises are encouraged 2-3 times daily.  The sling will need to be removed for pendulum exercises.     Pain medication: Take your pain medicine, as needed and prescribed.  Do not drive or operate machinery while taking narcotic pain medication.   You may start or resume anti-inflammatory medication (i.e. ibuprofen, naproxen) anytime after surgery, which should be taken with food to avoid stomach irritation.     Problems:     If you are having problems with your shoulder (unexpected pain, excessive bleeding or discharge from your incisions, fevers/chills) do not hesitate to call the office or visit the nearest emergency room for evaluation.     Follow-up:     Make sure that you have an appointment 7-14 days following surgery.  Your stitches will be removed, and your procedure/rehabilitation will be discussed at that time.   Physical therapy may be prescribed at that time.       Trinidad Sanchez MD   [facility]   [phone]    DIET: To avoid nausea, slowly advance diet as tolerated, avoiding spicy or greasy foods for the first day.  Add more substantial food to your diet according to your physician's instructions. INCREASE FLUIDS AND FIBER TO AVOID CONSTIPATION.    FOLLOW-UP APPOINTMENT:  A follow-up appointment should be arranged with your doctor in office in 7-14 days; call to schedule.    You should CALL YOUR PHYSICIAN if you develop:  Fever greater than 101 degrees F.  Pain not relieved by medication, or persistent nausea or vomiting.  Excessive bleeding (blood soaking through dressing) or unexpected drainage from the wound.  Extreme redness or swelling around the incision site, drainage of pus or foul smelling drainage.  Inability to urinate or empty your bladder within 8 hours.  Problems with breathing or chest pain.    You should call 911 if you develop problems with breathing or chest pain.  If you are unable to contact your doctor  or surgical center, you should go to the nearest emergency room or urgent care center.  Dr Sanchez's telephone #: [phone]    If any questions arise, call your doctor.  If your doctor is not available, please feel free to call the Surgical Center at [phone].  The Center is open Monday through Friday from 7AM to 7PM.  You can also call the HEALTH HOTLINE open 24 hours/day, 7 days/week and speak to a nurse at [phone], or toll free at [phone].    A registered nurse may call you a few days after your surgery to see how you are doing after your procedure.    MEDICATIONS: Resume taking daily medication.  Take prescribed pain medication with food.  If no medication is prescribed, you may take non-aspirin pain medication if needed.  PAIN MEDICATION CAN BE VERY CONSTIPATING.  Take a stool softener or laxative such as senokot, pericolace, or milk of magnesia if needed.    Prescription given for Home.  Last pain medication given at none.    If your physician has prescribed pain medication that includes Acetaminophen (Tylenol), do not take additional Acetaminophen (Tylenol) while taking the prescribed medication.    Depression / Suicide Risk    As you are discharged from this [facility] facility, it is important to learn how to keep safe from harming yourself.    Recognize the warning signs:  · Abrupt changes in personality, positive or negative- including increase in energy   · Giving away possessions  · Change in eating patterns- significant weight changes-  positive or negative  · Change in sleeping patterns- unable to sleep or sleeping all the time   · Unwillingness or inability to communicate  · Depression  · Unusual sadness, discouragement and loneliness  · Talk of wanting to die  · Neglect of personal appearance   · Rebelliousness- reckless behavior  · Withdrawal from people/activities they love  · Confusion- inability to concentrate     If you or a loved one observes any of these behaviors or  has concerns about self-harm, here's what you can do:  · Talk about it- your feelings and reasons for harming yourself  · Remove any means that you might use to hurt yourself (examples: pills, rope, extension cords, firearm)  · Get professional help from the community (Mental Health, Substance Abuse, psychological counseling)  · Do not be alone:Call your Safe Contact- someone whom you trust who will be there for you.  · Call your local CRISIS HOTLINE 226-1391 or 377-614-8443  · Call your local Children's Mobile Crisis Response Team [place] (162) 262-4450 or www.[employer]  · Call the toll free National Suicide Prevention Hotlines   · National Suicide Prevention Lifeline 370-325-LMSQ (8114)  [employer] Line Network 800-SUICIDE (767-3476)    Peripheral Nerve Block Discharge Instructions from Same Day Surgery and Inpatient :    What to Expect - Upper Extremity  · You may experience numbness and weakness in shoulder, arm and hand  on the same side as your surgery  · This is normal. For some people, this may be an unpleasant sensation. Be very careful with your numb limb  · Ask for help when you need it  Shoulder Surgery Side Effects  · In addition to numbness and weakness you may experience other symptoms  · Other nerves that are close to those nerves injected can also be affected by local anesthesia  · You may experience a hoarseness in your voice  · Your breathing may feel different  · You may also notice drooping of your eyelid, pupil constriction, and decreased sweating, on the side of your surgery  · All of these side effects are normal and will resolve when the local anesthetic wears off   Prevent Injury  · Protect the limb like a baby  · Beware of exposing your limb to extreme heat or cold or trauma  · The limb may be injured without you noticing because it is numb  · Keep the limb elevated whenever possible  · Do not sleep on the limb  · Change the position of the limb regularly  · Avoid putting  "pressure on your surgical limb  Pain Control  · The initial block on the day of surgery will make your extremity feel \"numb\"  · Any consecutive injection including prior to discharge from the hospital will make your extremity feel \"numb\"  · You may feel an aching or burning when the local anesthesia starts to wear off  · Take pain pills as prescribed by your surgeon  · Call your surgeon or anesthesiologist if you do not have adequate pain control      Instrucciones Para [place]  (Home Care Instructions)    ACTIVIDAD: Descanse y tome todo con mucha calma las primeras 24 horas después de curry cirugía.  Valery persona adulta responsable debe permanecer con usted smiley darius periodo de tiempo.  Es normal sentirse sonoliento o sonolienta smiley esas primeras horas.  Le recomendamos que no kassie nada que requiera equilibrio, alex decisiones a mucha coordinación de curry parte.    NO KASSIE ESTO PURANTE LAS PRIMERAS 24 HORAS:   Manejar o conducir algún vehiculo, operar maquinarias o utilizar electrodomesticos.   Beber cerveza o algún otro tipo de bebida alcohólica.   Alex decisiones importantes o firmar documentos legales.    INSTRUCCIONES ESPECIALES: ***    DIETA: Para evitar las nauseas, prosiga despacito con curry dieta a medida que pueda ir tolerándola mejor, evite comidas muy condimentadas o grasosas smiley darius primer día.  Vaya agregando comidas más substanciadas a curry dieta a medida que asi lo indique curry médica.  Los bebés pueden beber leche preparada o formula, ásl hema también leche del seno de la madre a medida que vayan teniendo hambre.  SIGA AGREGANDO LIQUIDOS Y COMIDAS CON FIBRA PARA EVITAR ESTREÑIMIENTO.    HEMA BAÑARSE Y CAMBIAR LOS VENDAJES DE LA CIRUGIA: ***    MEDICAMENTOS/MEDICINAS:  Vuelva a alex henrique medicamentos diarios.  [facility] los medicamentos que se le prescribe con un poco de comida.  Si no le prescribe ningún tipo de medicamento, entonces puede alex medicinas para el dolor que no contienen aspirina, si las " necesita.  LAS MEDICINAS PARA EL DOLOR PUEDEN ESTREÑIRLE MUCHO.  [place] un suavizante para el excremento o materia fecal (stool softener) o un laxativo claudio por ejemplo: senokot, pericolase, o leche de magnesia, si lo necesita.    La prescripción la administro ***.  La ultima sosis de medicina para el dolor fue administrada ***.     Se debe hacer nikhil consulta medica con el doctor en ***, Líame para hacer la curt.    Usted debe LIAMAR A CURRY MEDICO si tiene los siguientes síntomas:   -   Nikhil fiebre más maryam de 101 grados Fahrenheit.   -   Un dolor incesante aún con los medicamentos, o nauseas y vómito persistente.   -   Un sangrado excesivo (alvarado que traspasa los vendajes o gasas) o algúln tipo de drenaje inesperado que proviene de la henda.     -   Un color rosenbaum exagerado o hinchazón alrededor del área en donde se le hizo incisión o diamond, o un drenaje de pus o con olor leanne proveniente de la henda.   -    La inhabilidad de orinar o vaciar curry vejiga en 8 horas.   -    Problemas con a respiración o oneyda en el pecho.    Usted debe llamar al 911 si se presentan problemas con el dolor al respirar o el pecho.  Si no se puede ponnoer en comunicación con un medica o con el centro de cirugía, usted debe ir a la estación de emergencia (emergency room) más cercana o a un centro de atención de urgencia (urgent care center).  El teléfono del medico es: ***    LOS SÍNTOMAS DE UN LEVE RESFRIO SON MUY NORMALES.  ADEMÁS USTED PUEDE LLEGAR A SENTIR ONEYDA GENERALES DE MÚSCULOS, IRRITACIÓN EN LA GARGANTA, ONEYDA DE REJI Y/O UN POCO DE NAUSEAS.    Sie tiene alguna pregunta, llame a curry médico.  Si curry médico no se encuentra disponible, por favor llame al Centro de Cirugía at {Surgical Dept Numbers:[id number]}.  el Centro está abierto de Lunes a Viernes desde las 7:00 de la manana hasta las 7:00 de la noche.  Usted también puede llamar al CENTRO DE LLAMADAS SOBRE LA [place] o HEALTH HOTLINE.  Traci está abierto viente y cuatro horas por  kindra, siete mcmullen por semana, allí podrá hablar con nikhil enfermera.  Llame al [phone], o al número clovis 9 [phone].    Mi firma a continuación indica que he recibido y entiendco estas instrucciones acera de los cuidados en la casa (Home Care Instructions)    Usted recibirá nikhil encuesta en la correspondencia en las siguientes semanas y le pedimos que por favor tome un momento para completar heber encuesta y regresaría a hosotros.  Nuestro objetivó es brindarle un cuidado muy cedeno y par lo tanto apreciamos henrique coméntanos.  Muchas sydnie por chapin escogido el Centro de Cirugía de [facility].

## 2018-12-15 NOTE — OR SURGEON
Immediate Post OP Note    PreOp Diagnosis:  LEFT shoulder impingement, instability, large Hill Sachs, global labral tear    PostOp Diagnosis:  SAME     Procedure(s): LEFT   SHOULDER DECOMPRESSION ARTHROSCOPIC - SUBACROMIAL bursectomy - Wound Class: Clean  SHOULDER ARTHROSCOPY W/ SLAP / LABRAL REPAIR - REMPLISSAGE -  Wound Class: Clean    Surgeon(s):  Trinidad Sanchez M.D.    Anesthesiologist/Type of Anesthesia:  Anesthesiologist: John Donahue M.D.  Anesthesia Technician: Dom Ariza; Giles Mccullough/General    Surgical Staff:  Circulator: Mariya Fletcher R.N.  Scrub Person: Delaney Blackman  Private Scrub: LAURA Banks.[email or web address]    Specimens removed if any:  * No specimens in log *    Estimated Blood Loss: min    Findings: as above    Complications: none    Jony        12/15/2018 12:02 PM Trinidad Sanchez M.D.

## 2018-12-15 NOTE — OP REPORT
DATE OF SERVICE:  12/15/2018    PREOPERATIVE DIAGNOSES:  Left shoulder subacromial bursitis, instability,   labral tear, and large Hill-Sachs lesion.    POSTOPERATIVE DIAGNOSES:  Left shoulder subacromial bursitis, anterior   instability, large deep Hill-Sachs lesion, and global labral tear.    PROCEDURES PERFORMED:  Left shoulder arthroscopy, subacromial bursectomy,   anterior labral repair with capsular plication, posterior and superior labral   debridement, and arthroscopic remplissage.    SURGEON:  Trinidad Sanchez MD    ASSISTANT:  Donald Berman CFA    ANESTHESIOLOGIST:  John Donahue MD    TYPE OF ANESTHESIA:  General, with preoperative interscalene nerve block.    IV FLUID:  1 liter crystalloid.    ESTIMATED BLOOD LOSS:  Minimal.    DRAINS:  None.    SPECIMENS:  None.    COMPLICATIONS:  None.    IMPLANTS:  [place] Iconix anchors x4.    REASON FOR PROCEDURE:  The patient is a 48-year-old male who sustained a   dislocation episode in a work-related injury several months ago.  He continued   to have both pain as well as feelings of instability.  Plain x-ray as well as   MRI findings demonstrated a labral tear, as well as significantly a large   deep Hill-Sachs lesion.  We decided to proceed with arthroscopy.    DESCRIPTION OF OPERATION:  The patient was given a left interscalene nerve   block by the anesthesiologist before surgery.  Once back in the operating   room, a breathing tube was placed.  He was given 2 g of IV Ancef.  He was then   examined under anesthesia.  He was noted to have a mildly limited forward   flexion and a simple manipulation was performed.  He did have a clunk with   anterior force, consistent with his labral tear.  Next, the left upper   extremity was prepped with ChloraPrep and draped in standard sterile fashion.    It was then placed in the Arthrex traction device.  Bony landmarks were drawn   and a standard posterior portal was established.  The arthroscope was then   inserted  into the glenohumeral joint.  An anterosuperior cannula was placed in   the rotator interval, just beneath the biceps tendon.  A probe was inserted.    There was global labral fraying, with a displaced tear of the anterior   labrum.  There was a large deep Hill-Sachs lesion.  The rotator cuff was   intact, including the subscapularis tendon.  The labrum was debrided   posteriorly and superiorly.  The anterior labral tear was inspected.  The   decision was made to perform an anterior labral repair with a conservative   capsular plication, and remplissage to address the large deep Hill-Sachs   lesion.  Initially, the 8 mm cannula was placed anteroinferiorly.  The   anterior glenoid neck was then prepared using the shaver as a bone preparation   tool along the anterior glenoid neck.  Next, with the arthroscope in the   anterosuperior cannula, the Hill-Sachs lesion was identified.  It was scraped   with a curette to create a healing response.  Two spinal needles were placed   through accessory posterolateral percutaneous portals and malleted in to   approximate anchor placement for the remplissage.  Next, the arthroscope was   placed into the subacromial space.  A lateral portal was established.  There   was a copious amount of thickened, inflamed bursal tissue.  The 4-0 aggressive   shaver was then used to remove the thickened inflamed bursal tissue revealing   an intact rotator cuff.  The 2 spinal needles were identified with bursa   removed from each one.  Next, the arthroscope was placed back into the   posterior portal.  The anterior labral repair was then performed.  A 12-degree   guide was then placed along the anteroinferior glenoid rim at the 6:30-7   position in this left shoulder.  The first 2 anchors were drilled and tapped   into place.  Next, a lasso to the left was then used to come through a small   amount of capsular tissue as well as the labrum.  In a retrograde manner, a   1.2 mm tape was then passed  back through.  A second pass was taken, again   pulling the 1.2 mm back through, and this was tied down.  This nicely repaired   the more inferior aspect of the labral tear.  Next, a second anchor was   drilled and tapped into place.  This was an Iconix 1.4 mm anchor loaded with   1.2 mm tape.  Similarly, 2 passes were taken with the lasso to the left.  Only   labral tissue was taken with this pass to avoid over tightening the shoulder.    This was tied down as well.  Next, the arthroscope was placed into the   subacromial space.  Notably, 2 anchors had been placed in the Hill-Sachs   defect prior to repairing the anterior labrum.  These anchors were 2.3 mm   Iconix anchors triple loaded with a high strength #2 suture.  They were used   to replace the 2 spinal needles and noted to have an excellent hold in the   Hill-Sachs defect.  These were placed prior to the anterior labral repair.    With the arthroscope in the subacromial space, with all 4 anchors in place,   the remplissage was then performed.  The 2 suture bundles from the 2   Hill-Sachs defect anchors were identified.  A 5 mm cannula was placed in   between each one.  The blue suture from one anchor was then grasped out of the   cannula and tied to the blue suture of the other, which had been brought out   of the cannula as well.  Using a double pulley technique, the knot was then   pulled down to fill in the defect.  The free limb was then grasped from each   anchor.  These were then tied down.  This nicely compressed the infraspinatus   into the Hill-Sachs defect.  This was repeated with the second suture from   each anchor.  A double pulley was performed, reinforcing the remplissage and   infraspinatus tenodesis.  The third sutures were cut from each anchor.  Next,   the arthroscope was placed back into the anterosuperior cannula.  The   infraspinatus tenodesis was then inspected from the joint side.  Excellent   humeral head centering was noted, with no  visualization of the Hill-Sachs   defect as it had been filled with the infraspinatus.  One liter of bacitracin   saline fluid was flushed through the subacromial space as well as the   glenohumeral joint.  All instruments were then removed.  Portals were closed   with 3-0 nylon suture.  A sterile dressing was applied.  All drapes were   removed.  The arm was carefully taken out of traction and placed into a   shoulder abduction sling.  The patient was placed supine on a stretcher and   taken to recovery room, in stable condition.       ____________________________________     MD SHASHI PÉREZ / SONIA    DD:  12/15/2018 12:13:22  DT:  12/15/2018 12:37:30    D#:  [id number]  Job#:  [id number]

## 2018-12-15 NOTE — OR NURSING
Patient allergies and NPO status verified, home medication reconciliation completed, belongings secured. Patient verbalizes understanding of pain scale, expected course of stay and plan of care. Surgical site verified with patient, IV access established sequentials and DASIA hose placed on BLE.

## (undated) DEVICE — NEEDLE W/FACET TIP DULL VERSION W/STIMULATION CABLE SONOPLEX 21G X 4 (10/EA)"

## (undated) DEVICE — CHLORAPREP 26 ML APPLICATOR - ORANGE TINT(25/CA)

## (undated) DEVICE — TAPE CLOTH MEDIPORE 6 INCH - (12RL/CA)

## (undated) DEVICE — SENSOR SPO2 NEO LNCS ADHESIVE (20/BX) SEE USER NOTES

## (undated) DEVICE — DRESSING ABDOMINAL PAD STERILE 8 X 10" (360EA/CA)"

## (undated) DEVICE — GLOVE BIOGEL INDICATOR SZ 7.5 SURGICAL PF LTX - (50PR/BX 4BX/CA)

## (undated) DEVICE — SODIUM CHL. IRRIGATION 0.9% 3000ML (4EA/CA 65CA/PF)

## (undated) DEVICE — TUBING PATIENT W/CONNECTOR REDEUCE (1EA)

## (undated) DEVICE — MASK ANESTHESIA ADULT  - (100/CA)

## (undated) DEVICE — SUTURE GENERAL

## (undated) DEVICE — GLOVE, LITE (PAIR)

## (undated) DEVICE — TUBING PUMP WITH CONNECTOR REDEUCE (1EA)

## (undated) DEVICE — KIT ROOM DECONTAMINATION

## (undated) DEVICE — CANNULA THREADED 5X75 (5EA/BX)

## (undated) DEVICE — GOWN SURGICAL X-LARGE ULTRA - FILM-REINFORCED (20/CA)

## (undated) DEVICE — SLEEVE SHOULDER DISP(ARTHREX) - (6/BX)

## (undated) DEVICE — HUMID-VENT HEAT AND MOISTURE EXCHANGE- (50/BX)

## (undated) DEVICE — GLOVE 7.0 LF PF PROTEXIS (50PR/BX)

## (undated) DEVICE — TUBE E-T HI-LO CUFF 7.0MM (10EA/PK)

## (undated) DEVICE — CANISTER SUCTION RIGID RED 1500CC (40EA/CA)

## (undated) DEVICE — DRESSING XEROFORM 1X8 - (50/BX 4BX/CA)

## (undated) DEVICE — GLOVE BIOGEL SZ 8 SURGICAL PF LTX - (50PR/BX 4BX/CA)

## (undated) DEVICE — SUCTION INSTRUMENT YANKAUER BULBOUS TIP W/O VENT (50EA/CA)

## (undated) DEVICE — BAG, SPONGE COUNT 50600

## (undated) DEVICE — SYRINGE ASEPTO - (50EA/CA

## (undated) DEVICE — HEAD HOLDER JUNIOR/ADULT

## (undated) DEVICE — ELECTRODE 850 FOAM ADHESIVE - HYDROGEL RADIOTRNSPRNT (50/PK)

## (undated) DEVICE — WATER IRRIGATION STERILE 1000ML (12EA/CA)

## (undated) DEVICE — GOWN WARMING STANDARD FLEX - (30/CA)

## (undated) DEVICE — TUBE CONNECTING SUCTION - CLEAR PLASTIC STERILE 72 IN (50EA/CA)

## (undated) DEVICE — ABLATOR WAND SERFAS 90-S CRUISE

## (undated) DEVICE — DRAPE U SPLIT IMP 54 X 76 - (24/CA)

## (undated) DEVICE — SODIUM CHL IRRIGATION 0.9% 1000ML (12EA/CA)

## (undated) DEVICE — KIT ANESTHESIA W/CIRCUIT & 3/LT BAG W/FILTER (20EA/CA)

## (undated) DEVICE — SHAVER4.0 AGGRESSIVE + FORMLA (5EA/BX)

## (undated) DEVICE — PROTECTOR ULNA NERVE - (36PR/CA)

## (undated) DEVICE — DRAPE IOBAN II INCISE 23X17 - (10EA/BX 4BX/CA)

## (undated) DEVICE — SHAVER, 5.5 RESECTOR

## (undated) DEVICE — SPONGE GAUZESTER 4 X 4 4PLY - (128PK/CA)

## (undated) DEVICE — GLOVE BIOGEL SZ 7.5 SURGICAL PF LTX - (50PR/BX 4BX/CA)

## (undated) DEVICE — PACK SHOULDER ARTHROSCOPY SM - (2EA/CA)

## (undated) DEVICE — GLOVE BIOGEL INDICATOR SZ 7SURGICAL PF LTX - (50/BX 4BX/CA)

## (undated) DEVICE — GLOVE BIOGEL INDICATOR SZ 8 SURGICAL PF LTX - (50/BX 4BX/CA)

## (undated) DEVICE — NEPTUNE 4 PORT MANIFOLD - (20/PK)

## (undated) DEVICE — BLOCK

## (undated) DEVICE — DRAPE SHOULDER FLUID CONTROL - 77 X 85 (10/CA)

## (undated) DEVICE — ELECTRODE DUAL RETURN W/ CORD - (50/PK)

## (undated) DEVICE — SUTURE 3-0 ETHILON FS-1 - (36/BX) 30 INCH

## (undated) DEVICE — GLOVE BIOGEL SZ 7 SURGICAL PF LTX - (50PR/BX 4BX/CA)